# Patient Record
Sex: FEMALE | Race: BLACK OR AFRICAN AMERICAN | NOT HISPANIC OR LATINO | URBAN - METROPOLITAN AREA
[De-identification: names, ages, dates, MRNs, and addresses within clinical notes are randomized per-mention and may not be internally consistent; named-entity substitution may affect disease eponyms.]

---

## 2018-07-23 ENCOUNTER — IMPORTED ENCOUNTER (OUTPATIENT)
Dept: URBAN - METROPOLITAN AREA CLINIC 9 | Facility: CLINIC | Age: 63
End: 2018-07-23

## 2019-07-29 NOTE — PATIENT DISCUSSION
CLEARED FOR PHACO OU. DISCUSSED VISUALLY SIGNIFICANT CATARACT PROGRESSION IN BOTH EYES. PATIENT DECLINED PHACO REFERRAL AT THIS TIME, AND ELECTED TO SCHEDULE RETINA FOLLOW-UP ONLY.

## 2019-10-28 NOTE — PATIENT DISCUSSION
CLEARED FOR PHACO OU. 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 REFER TO DR. Tylene Goltz FOR PHACO EVAL OU.

## 2020-05-06 ENCOUNTER — IMPORTED ENCOUNTER (OUTPATIENT)
Dept: URBAN - METROPOLITAN AREA CLINIC 9 | Facility: CLINIC | Age: 65
End: 2020-05-06

## 2020-05-11 ENCOUNTER — IMPORTED ENCOUNTER (OUTPATIENT)
Dept: URBAN - METROPOLITAN AREA CLINIC 9 | Facility: CLINIC | Age: 65
End: 2020-05-11

## 2020-05-20 ENCOUNTER — IMPORTED ENCOUNTER (OUTPATIENT)
Dept: URBAN - METROPOLITAN AREA CLINIC 9 | Facility: CLINIC | Age: 65
End: 2020-05-20

## 2020-06-12 ENCOUNTER — IMPORTED ENCOUNTER (OUTPATIENT)
Dept: URBAN - METROPOLITAN AREA CLINIC 9 | Facility: CLINIC | Age: 65
End: 2020-06-12

## 2020-06-23 ENCOUNTER — IMPORTED ENCOUNTER (OUTPATIENT)
Dept: URBAN - METROPOLITAN AREA CLINIC 9 | Facility: CLINIC | Age: 65
End: 2020-06-23

## 2020-07-10 ENCOUNTER — IMPORTED ENCOUNTER (OUTPATIENT)
Dept: URBAN - METROPOLITAN AREA CLINIC 9 | Facility: CLINIC | Age: 65
End: 2020-07-10

## 2020-08-19 ENCOUNTER — IMPORTED ENCOUNTER (OUTPATIENT)
Dept: URBAN - METROPOLITAN AREA CLINIC 9 | Facility: CLINIC | Age: 65
End: 2020-08-19

## 2020-09-01 ENCOUNTER — IMPORTED ENCOUNTER (OUTPATIENT)
Dept: URBAN - METROPOLITAN AREA CLINIC 9 | Facility: CLINIC | Age: 65
End: 2020-09-01

## 2021-07-14 ENCOUNTER — IMPORTED ENCOUNTER (OUTPATIENT)
Dept: URBAN - METROPOLITAN AREA CLINIC 9 | Facility: CLINIC | Age: 66
End: 2021-07-14

## 2021-07-14 PROBLEM — H04.123: Noted: 2021-07-14

## 2021-10-19 ASSESSMENT — TONOMETRY
OD_IOP_MMHG: 14
OD_IOP_MMHG: 16
OD_IOP_MMHG: 12
OS_IOP_MMHG: 7
OS_IOP_MMHG: 14
OD_IOP_MMHG: 13
OD_IOP_MMHG: 16
OS_IOP_MMHG: 17
OD_IOP_MMHG: 17
OS_IOP_MMHG: 12
OS_IOP_MMHG: 13
OS_IOP_MMHG: 17
OS_IOP_MMHG: 22
OD_IOP_MMHG: 15

## 2021-10-19 ASSESSMENT — VISUAL ACUITY
OD_SC: 20/50 -2 SN
OD_SC: 20/30 SN
OS_CC: 20/20 SN
OS_PH: J5 JG
OS_SC: 20/20 - SN
OS_CC: 20/20 SN
OD_SC: 20/25 -2 SN
OS_SC: 20/20 - SN
OD_SC: 20/40 -2 SN
OD_SC: 20/30 +2 SN
OS_SC: 20/30 + SN
OS_CC: 20/20 SN
OS_SC: 20/20 -2 SN
OS_SC: 20/30 SN
OD_PH: 20/25 -2 SN
OS_SC: 20/20 - SN
OS_SC: 20/30 SN
OS_SC: 20/25 SN
OS_SC: 20/25 + SN
OD_CC: 20/20 SN
OD_CC: 20/20 SN
OD_SC: 20/40 -2 SN
OD_CC: 20/20 SN
OD_CC: 20/20 - SN
OD_SC: 20/40 -2 SN
OS_CC: 20/20 SN

## 2022-04-21 ENCOUNTER — ESTABLISHED PATIENT (OUTPATIENT)
Dept: URBAN - METROPOLITAN AREA CLINIC 4 | Facility: CLINIC | Age: 67
End: 2022-04-21

## 2022-04-21 DIAGNOSIS — H01.023: ICD-10-CM

## 2022-04-21 DIAGNOSIS — H52.01: ICD-10-CM

## 2022-04-21 DIAGNOSIS — H04.123: ICD-10-CM

## 2022-04-21 DIAGNOSIS — H25.11: ICD-10-CM

## 2022-04-21 DIAGNOSIS — H01.026: ICD-10-CM

## 2022-04-21 PROCEDURE — 99213 OFFICE O/P EST LOW 20 MIN: CPT

## 2022-04-21 PROCEDURE — 92015 DETERMINE REFRACTIVE STATE: CPT

## 2022-04-21 ASSESSMENT — TONOMETRY
OS_IOP_MMHG: 14
OD_IOP_MMHG: 14

## 2022-04-21 ASSESSMENT — KERATOMETRY
OD_K2POWER_DIOPTERS: 42.25
OS_K1POWER_DIOPTERS: 40.25
OS_AXISANGLE_DEGREES: 143
OD_AXISANGLE2_DEGREES: 96
OS_AXISANGLE2_DEGREES: 53
OD_AXISANGLE_DEGREES: 006
OS_K2POWER_DIOPTERS: 40.50
OD_K1POWER_DIOPTERS: 41

## 2022-04-21 ASSESSMENT — VISUAL ACUITY
OS_SC: 20/20-1
OU_SC: 20/30
OD_SC: 20/50

## 2022-06-19 RX ORDER — TIZANIDINE 4 MG/1
TABLET ORAL
COMMUNITY

## 2022-06-19 RX ORDER — LOSARTAN POTASSIUM AND HYDROCHLOROTHIAZIDE 25; 100 MG/1; MG/1
TABLET ORAL
COMMUNITY

## 2022-06-19 RX ORDER — ATORVASTATIN CALCIUM 80 MG/1
TABLET, FILM COATED ORAL
COMMUNITY

## 2023-07-07 ENCOUNTER — ESTABLISHED PATIENT (OUTPATIENT)
Dept: URBAN - METROPOLITAN AREA CLINIC 4 | Facility: CLINIC | Age: 68
End: 2023-07-07

## 2023-07-07 DIAGNOSIS — H25.11: ICD-10-CM

## 2023-07-07 DIAGNOSIS — H04.123: ICD-10-CM

## 2023-07-07 PROCEDURE — 92015 DETERMINE REFRACTIVE STATE: CPT

## 2023-07-07 PROCEDURE — 99214 OFFICE O/P EST MOD 30 MIN: CPT

## 2023-07-07 ASSESSMENT — KERATOMETRY
OD_AXISANGLE2_DEGREES: 99
OS_K1POWER_DIOPTERS: 40.50
OS_AXISANGLE2_DEGREES: 80
OD_AXISANGLE_DEGREES: 09
OD_K2POWER_DIOPTERS: 41.75
OD_K1POWER_DIOPTERS: 40.75
OS_K2POWER_DIOPTERS: 41.75
OS_AXISANGLE_DEGREES: 170

## 2023-07-07 ASSESSMENT — VISUAL ACUITY
OD_GLARE: 20/60
OU_SC: J3
OD_SC: J16
OD_SC: 20/50-1
OS_SC: J5
OS_GLARE: 20/40
OS_SC: 20/20

## 2023-07-07 ASSESSMENT — TONOMETRY
OS_IOP_MMHG: 10
OD_IOP_MMHG: 12

## 2023-08-02 ENCOUNTER — TECH ONLY (OUTPATIENT)
Dept: URBAN - METROPOLITAN AREA CLINIC 4 | Facility: CLINIC | Age: 68
End: 2023-08-02

## 2023-08-02 DIAGNOSIS — H40.1131: ICD-10-CM

## 2023-08-02 PROCEDURE — 92083 EXTENDED VISUAL FIELD XM: CPT

## 2023-08-02 PROCEDURE — 76514 ECHO EXAM OF EYE THICKNESS: CPT

## 2023-08-02 PROCEDURE — 92133 CPTRZD OPH DX IMG PST SGM ON: CPT

## 2023-08-02 ASSESSMENT — KERATOMETRY
OD_AXISANGLE2_DEGREES: 99
OD_K1POWER_DIOPTERS: 40.75
OD_K2POWER_DIOPTERS: 41.75
OS_K2POWER_DIOPTERS: 41.75
OS_AXISANGLE_DEGREES: 170
OS_K1POWER_DIOPTERS: 40.50
OD_AXISANGLE_DEGREES: 09
OS_AXISANGLE2_DEGREES: 80

## 2023-08-02 ASSESSMENT — PACHYMETRY
OD_CT_UM: 481
OS_CT_UM: 531

## 2023-08-08 ENCOUNTER — PRE-OP/H&P (OUTPATIENT)
Dept: URBAN - METROPOLITAN AREA CLINIC 4 | Facility: CLINIC | Age: 68
End: 2023-08-08

## 2023-08-08 VITALS — DIASTOLIC BLOOD PRESSURE: 74 MMHG | HEART RATE: 79 BPM | HEIGHT: 55 IN | SYSTOLIC BLOOD PRESSURE: 114 MMHG

## 2023-08-08 DIAGNOSIS — H25.11: ICD-10-CM

## 2023-08-08 PROCEDURE — 92136 OPHTHALMIC BIOMETRY: CPT

## 2023-08-08 ASSESSMENT — KERATOMETRY
OD_K2POWER_DIOPTERS: 41.75
OD_K1POWER_DIOPTERS: 40.75
OD_AXISANGLE_DEGREES: 09
OS_AXISANGLE2_DEGREES: 80
OS_K2POWER_DIOPTERS: 41.75
OS_K1POWER_DIOPTERS: 40.50
OS_AXISANGLE_DEGREES: 170
OD_AXISANGLE2_DEGREES: 99

## 2023-09-12 ENCOUNTER — POST-OP (OUTPATIENT)
Dept: URBAN - METROPOLITAN AREA CLINIC 4 | Facility: CLINIC | Age: 68
End: 2023-09-12

## 2023-09-12 DIAGNOSIS — Z96.1: ICD-10-CM

## 2023-09-12 PROCEDURE — 99024 POSTOP FOLLOW-UP VISIT: CPT

## 2023-09-12 ASSESSMENT — KERATOMETRY
OS_AXISANGLE_DEGREES: 170
OS_AXISANGLE2_DEGREES: 80
OD_AXISANGLE2_DEGREES: 99
OS_K2POWER_DIOPTERS: 41.75
OD_K1POWER_DIOPTERS: 40.75
OD_K2POWER_DIOPTERS: 41.75
OD_AXISANGLE_DEGREES: 09
OS_K1POWER_DIOPTERS: 40.50

## 2023-09-12 ASSESSMENT — TONOMETRY: OD_IOP_MMHG: 17

## 2023-09-12 ASSESSMENT — VISUAL ACUITY: OD_SC: 20/40

## 2023-09-28 ENCOUNTER — POST-OP (OUTPATIENT)
Dept: URBAN - METROPOLITAN AREA CLINIC 4 | Facility: CLINIC | Age: 68
End: 2023-09-28

## 2023-09-28 DIAGNOSIS — H25.11: ICD-10-CM

## 2023-09-28 DIAGNOSIS — Z96.1: ICD-10-CM

## 2023-09-28 PROCEDURE — 99024 POSTOP FOLLOW-UP VISIT: CPT

## 2023-09-28 ASSESSMENT — VISUAL ACUITY
OD_SC: 20/20
OS_GLARE: 20/30
OS_SC: 20/25
OD_GLARE: 20/30

## 2023-09-28 ASSESSMENT — TONOMETRY
OD_IOP_MMHG: 10
OS_IOP_MMHG: 10

## 2023-09-28 ASSESSMENT — KERATOMETRY
OD_K1POWER_DIOPTERS: 40.75
OD_K2POWER_DIOPTERS: 41.75
OD_AXISANGLE2_DEGREES: 99
OS_AXISANGLE_DEGREES: 170
OS_AXISANGLE2_DEGREES: 80
OD_AXISANGLE_DEGREES: 09
OS_K2POWER_DIOPTERS: 41.75
OS_K1POWER_DIOPTERS: 40.50

## 2023-10-12 ENCOUNTER — FOLLOW UP (OUTPATIENT)
Facility: LOCATION | Age: 68
End: 2023-10-12

## 2023-10-12 DIAGNOSIS — Z96.1: ICD-10-CM

## 2023-10-12 PROCEDURE — 99024 POSTOP FOLLOW-UP VISIT: CPT

## 2023-10-12 ASSESSMENT — TONOMETRY
OS_IOP_MMHG: 14
OD_IOP_MMHG: 16

## 2023-10-12 ASSESSMENT — KERATOMETRY
OS_AXISANGLE2_DEGREES: 80
OD_AXISANGLE_DEGREES: 09
OS_AXISANGLE_DEGREES: 170
OS_K2POWER_DIOPTERS: 41.75
OD_AXISANGLE2_DEGREES: 99
OS_K1POWER_DIOPTERS: 40.50
OD_K2POWER_DIOPTERS: 41.75
OD_K1POWER_DIOPTERS: 40.75

## 2023-10-12 ASSESSMENT — VISUAL ACUITY
OD_SC: 20/20
OS_SC: 20/25

## 2023-12-04 ENCOUNTER — ESTABLISHED PATIENT (OUTPATIENT)
Facility: LOCATION | Age: 68
End: 2023-12-04

## 2023-12-04 DIAGNOSIS — H16.143: ICD-10-CM

## 2023-12-04 DIAGNOSIS — H40.1131: ICD-10-CM

## 2023-12-04 DIAGNOSIS — H43.811: ICD-10-CM

## 2023-12-04 DIAGNOSIS — H01.02B: ICD-10-CM

## 2023-12-04 DIAGNOSIS — Z96.1: ICD-10-CM

## 2023-12-04 DIAGNOSIS — H04.123: ICD-10-CM

## 2023-12-04 DIAGNOSIS — H52.01: ICD-10-CM

## 2023-12-04 DIAGNOSIS — H01.02A: ICD-10-CM

## 2023-12-04 PROCEDURE — 92014 COMPRE OPH EXAM EST PT 1/>: CPT

## 2023-12-04 PROCEDURE — 92015 DETERMINE REFRACTIVE STATE: CPT

## 2023-12-04 ASSESSMENT — KERATOMETRY
OD_K2POWER_DIOPTERS: 41.75
OS_AXISANGLE_DEGREES: 170
OS_AXISANGLE2_DEGREES: 80
OS_K2POWER_DIOPTERS: 41.75
OS_K1POWER_DIOPTERS: 40.50
OD_K1POWER_DIOPTERS: 40.75
OD_AXISANGLE2_DEGREES: 99
OD_AXISANGLE_DEGREES: 09

## 2023-12-04 ASSESSMENT — TONOMETRY
OS_IOP_MMHG: 12
OD_IOP_MMHG: 12

## 2023-12-04 ASSESSMENT — VISUAL ACUITY
OU_SC: J3
OD_SC: 20/20
OS_SC: 20/20

## 2024-02-02 ENCOUNTER — ESTABLISHED PATIENT (OUTPATIENT)
Facility: LOCATION | Age: 69
End: 2024-02-02

## 2024-02-02 DIAGNOSIS — H52.01: ICD-10-CM

## 2024-02-02 DIAGNOSIS — H01.02A: ICD-10-CM

## 2024-02-02 DIAGNOSIS — H01.02B: ICD-10-CM

## 2024-02-02 DIAGNOSIS — H16.143: ICD-10-CM

## 2024-02-02 PROCEDURE — 92014 COMPRE OPH EXAM EST PT 1/>: CPT

## 2024-02-02 ASSESSMENT — KERATOMETRY
OS_K1POWER_DIOPTERS: 40.50
OS_K2POWER_DIOPTERS: 41.75
OS_AXISANGLE_DEGREES: 170
OD_K1POWER_DIOPTERS: 40.75
OD_AXISANGLE2_DEGREES: 99
OD_AXISANGLE_DEGREES: 09
OD_K2POWER_DIOPTERS: 41.75
OS_AXISANGLE2_DEGREES: 80

## 2024-02-02 ASSESSMENT — VISUAL ACUITY
OS_SC: 20/25
OD_SC: 20/25

## 2024-02-02 ASSESSMENT — TONOMETRY
OS_IOP_MMHG: 17
OD_IOP_MMHG: 16

## 2024-12-26 ENCOUNTER — EMERGENCY VISIT (OUTPATIENT)
Age: 69
End: 2024-12-26

## 2024-12-26 DIAGNOSIS — H20.9: ICD-10-CM

## 2024-12-26 PROCEDURE — 92012 INTRM OPH EXAM EST PATIENT: CPT

## 2024-12-26 RX ORDER — PREDNISOLONE ACETATE 10 MG/ML: 1 SUSPENSION/ DROPS OPHTHALMIC

## 2024-12-31 ENCOUNTER — COMPREHENSIVE EXAM (OUTPATIENT)
Age: 69
End: 2024-12-31

## 2024-12-31 DIAGNOSIS — H43.813: ICD-10-CM

## 2024-12-31 DIAGNOSIS — H20.9: ICD-10-CM

## 2024-12-31 DIAGNOSIS — H15.833: ICD-10-CM

## 2024-12-31 PROCEDURE — 92201 OPSCPY EXTND RTA DRAW UNI/BI: CPT

## 2024-12-31 PROCEDURE — 92014 COMPRE OPH EXAM EST PT 1/>: CPT

## 2025-02-25 ENCOUNTER — FOLLOW UP (OUTPATIENT)
Age: 70
End: 2025-02-25

## 2025-02-25 DIAGNOSIS — H15.833: ICD-10-CM

## 2025-02-25 DIAGNOSIS — H43.813: ICD-10-CM

## 2025-02-25 DIAGNOSIS — H20.9: ICD-10-CM

## 2025-02-25 PROCEDURE — 99214 OFFICE O/P EST MOD 30 MIN: CPT

## 2025-02-25 PROCEDURE — 92134 CPTRZ OPH DX IMG PST SGM RTA: CPT
